# Patient Record
Sex: MALE | Race: BLACK OR AFRICAN AMERICAN | NOT HISPANIC OR LATINO | Employment: PART TIME | URBAN - METROPOLITAN AREA
[De-identification: names, ages, dates, MRNs, and addresses within clinical notes are randomized per-mention and may not be internally consistent; named-entity substitution may affect disease eponyms.]

---

## 2022-01-09 ENCOUNTER — APPOINTMENT (OUTPATIENT)
Dept: CT IMAGING | Facility: CLINIC | Age: 27
End: 2022-01-09
Attending: EMERGENCY MEDICINE
Payer: MEDICAID

## 2022-01-09 ENCOUNTER — HOSPITAL ENCOUNTER (EMERGENCY)
Facility: CLINIC | Age: 27
Discharge: HOME | End: 2022-01-09
Attending: EMERGENCY MEDICINE | Admitting: EMERGENCY MEDICINE
Payer: MEDICAID

## 2022-01-09 VITALS
OXYGEN SATURATION: 99 % | TEMPERATURE: 98.3 F | DIASTOLIC BLOOD PRESSURE: 66 MMHG | WEIGHT: 195 LBS | RESPIRATION RATE: 13 BRPM | HEART RATE: 53 BPM | SYSTOLIC BLOOD PRESSURE: 114 MMHG

## 2022-01-09 DIAGNOSIS — R56.9 SEIZURES (H): ICD-10-CM

## 2022-01-09 LAB
ALBUMIN SERPL-MCNC: 3.8 G/DL (ref 3.5–5)
ALBUMIN UR-MCNC: NEGATIVE MG/DL
ALP SERPL-CCNC: 59 U/L (ref 45–120)
ALT SERPL W P-5'-P-CCNC: 19 U/L (ref 0–45)
AMPHETAMINES UR QL SCN: ABNORMAL
ANION GAP SERPL CALCULATED.3IONS-SCNC: 5 MMOL/L (ref 5–18)
APPEARANCE UR: CLEAR
AST SERPL W P-5'-P-CCNC: 29 U/L (ref 0–40)
ATRIAL RATE - MUSE: 65 BPM
BARBITURATES UR QL: ABNORMAL
BASOPHILS # BLD AUTO: 0.1 10E3/UL (ref 0–0.2)
BASOPHILS NFR BLD AUTO: 1 %
BENZODIAZ UR QL: ABNORMAL
BILIRUB SERPL-MCNC: 0.5 MG/DL (ref 0–1)
BILIRUB UR QL STRIP: NEGATIVE
BUN SERPL-MCNC: 11 MG/DL (ref 8–22)
CALCIUM SERPL-MCNC: 8.9 MG/DL (ref 8.5–10.5)
CANNABINOIDS UR QL SCN: ABNORMAL
CHLORIDE BLD-SCNC: 109 MMOL/L (ref 98–107)
CO2 SERPL-SCNC: 25 MMOL/L (ref 22–31)
COCAINE UR QL: ABNORMAL
COLOR UR AUTO: ABNORMAL
CREAT SERPL-MCNC: 0.95 MG/DL (ref 0.7–1.3)
CREAT UR-MCNC: 137 MG/DL
DIASTOLIC BLOOD PRESSURE - MUSE: 72 MMHG
EOSINOPHIL # BLD AUTO: 0.3 10E3/UL (ref 0–0.7)
EOSINOPHIL NFR BLD AUTO: 3 %
ERYTHROCYTE [DISTWIDTH] IN BLOOD BY AUTOMATED COUNT: 11.7 % (ref 10–15)
ETHANOL SERPL-MCNC: <10 MG/DL
GFR SERPL CREATININE-BSD FRML MDRD: >90 ML/MIN/1.73M2
GLUCOSE BLD-MCNC: 99 MG/DL (ref 70–125)
GLUCOSE UR STRIP-MCNC: NEGATIVE MG/DL
HCT VFR BLD AUTO: 43.6 % (ref 40–53)
HGB BLD-MCNC: 14.8 G/DL (ref 13.3–17.7)
HGB UR QL STRIP: NEGATIVE
IMM GRANULOCYTES # BLD: 0.2 10E3/UL
IMM GRANULOCYTES NFR BLD: 2 %
INTERPRETATION ECG - MUSE: NORMAL
KETONES UR STRIP-MCNC: NEGATIVE MG/DL
LACTATE SERPL-SCNC: 1.7 MMOL/L (ref 0.7–2)
LEUKOCYTE ESTERASE UR QL STRIP: NEGATIVE
LYMPHOCYTES # BLD AUTO: 2 10E3/UL (ref 0.8–5.3)
LYMPHOCYTES NFR BLD AUTO: 21 %
MCH RBC QN AUTO: 31 PG (ref 26.5–33)
MCHC RBC AUTO-ENTMCNC: 33.9 G/DL (ref 31.5–36.5)
MCV RBC AUTO: 91 FL (ref 78–100)
METHADONE UR QL SCN: ABNORMAL
MONOCYTES # BLD AUTO: 0.8 10E3/UL (ref 0–1.3)
MONOCYTES NFR BLD AUTO: 8 %
MUCOUS THREADS #/AREA URNS LPF: PRESENT /LPF
NEUTROPHILS # BLD AUTO: 6.4 10E3/UL (ref 1.6–8.3)
NEUTROPHILS NFR BLD AUTO: 65 %
NITRATE UR QL: NEGATIVE
NRBC # BLD AUTO: 0 10E3/UL
NRBC BLD AUTO-RTO: 0 /100
OPIATES UR QL SCN: ABNORMAL
OXYCODONE UR QL: ABNORMAL
P AXIS - MUSE: 63 DEGREES
PCP UR QL SCN: ABNORMAL
PH UR STRIP: 5 [PH] (ref 5–7)
PLATELET # BLD AUTO: 287 10E3/UL (ref 150–450)
POTASSIUM BLD-SCNC: 4.4 MMOL/L (ref 3.5–5)
PR INTERVAL - MUSE: 196 MS
PROT SERPL-MCNC: 6.5 G/DL (ref 6–8)
QRS DURATION - MUSE: 88 MS
QT - MUSE: 380 MS
QTC - MUSE: 395 MS
R AXIS - MUSE: 86 DEGREES
RBC # BLD AUTO: 4.78 10E6/UL (ref 4.4–5.9)
RBC URINE: <1 /HPF
SODIUM SERPL-SCNC: 139 MMOL/L (ref 136–145)
SP GR UR STRIP: 1.02 (ref 1–1.03)
SYSTOLIC BLOOD PRESSURE - MUSE: 131 MMHG
T AXIS - MUSE: 43 DEGREES
UROBILINOGEN UR STRIP-MCNC: <2 MG/DL
VENTRICULAR RATE- MUSE: 65 BPM
WBC # BLD AUTO: 9.6 10E3/UL (ref 4–11)
WBC URINE: 4 /HPF

## 2022-01-09 PROCEDURE — 99285 EMERGENCY DEPT VISIT HI MDM: CPT | Mod: 25

## 2022-01-09 PROCEDURE — 250N000013 HC RX MED GY IP 250 OP 250 PS 637: Performed by: EMERGENCY MEDICINE

## 2022-01-09 PROCEDURE — 82077 ASSAY SPEC XCP UR&BREATH IA: CPT | Performed by: EMERGENCY MEDICINE

## 2022-01-09 PROCEDURE — 258N000003 HC RX IP 258 OP 636: Performed by: EMERGENCY MEDICINE

## 2022-01-09 PROCEDURE — 83605 ASSAY OF LACTIC ACID: CPT | Performed by: EMERGENCY MEDICINE

## 2022-01-09 PROCEDURE — 80053 COMPREHEN METABOLIC PANEL: CPT | Performed by: EMERGENCY MEDICINE

## 2022-01-09 PROCEDURE — 70450 CT HEAD/BRAIN W/O DYE: CPT

## 2022-01-09 PROCEDURE — 250N000011 HC RX IP 250 OP 636: Performed by: EMERGENCY MEDICINE

## 2022-01-09 PROCEDURE — 80307 DRUG TEST PRSMV CHEM ANLYZR: CPT | Performed by: EMERGENCY MEDICINE

## 2022-01-09 PROCEDURE — 82040 ASSAY OF SERUM ALBUMIN: CPT | Performed by: EMERGENCY MEDICINE

## 2022-01-09 PROCEDURE — 81001 URINALYSIS AUTO W/SCOPE: CPT | Performed by: EMERGENCY MEDICINE

## 2022-01-09 PROCEDURE — 36415 COLL VENOUS BLD VENIPUNCTURE: CPT | Performed by: EMERGENCY MEDICINE

## 2022-01-09 PROCEDURE — 96365 THER/PROPH/DIAG IV INF INIT: CPT

## 2022-01-09 PROCEDURE — 85025 COMPLETE CBC W/AUTO DIFF WBC: CPT | Performed by: EMERGENCY MEDICINE

## 2022-01-09 PROCEDURE — C9803 HOPD COVID-19 SPEC COLLECT: HCPCS

## 2022-01-09 PROCEDURE — 93005 ELECTROCARDIOGRAM TRACING: CPT | Performed by: EMERGENCY MEDICINE

## 2022-01-09 RX ORDER — LEVETIRACETAM 500 MG/1
500 TABLET ORAL 2 TIMES DAILY
Qty: 40 TABLET | Refills: 0 | Status: SHIPPED | OUTPATIENT
Start: 2022-01-09 | End: 2022-01-29

## 2022-01-09 RX ORDER — ACETAMINOPHEN 325 MG/1
650 TABLET ORAL ONCE
Status: COMPLETED | OUTPATIENT
Start: 2022-01-09 | End: 2022-01-09

## 2022-01-09 RX ADMIN — ACETAMINOPHEN 650 MG: 325 TABLET ORAL at 10:50

## 2022-01-09 RX ADMIN — LEVETIRACETAM 1000 MG: 100 INJECTION, SOLUTION INTRAVENOUS at 10:29

## 2022-01-09 NOTE — ED TRIAGE NOTES
Pt brought in by [facility] for seizure in bed.  Spouse awoke to pt with clonic tonic seizure witnessed for 5 to 10 minutes with post ictal response observed by [facility].  Denies prior seizure hx.  BGL reported by EMS was 114. Pt is 10 days since covid test, (14 days ago he and his spouse tested positive).

## 2022-01-09 NOTE — ED PROVIDER NOTES
EMERGENCY DEPARTMENT ENCOUNTER      NAME: Ric Castro  AGE: 26 year old male  YOB: 1995  MRN: [id number]  EVALUATION DATE & TIME: 1/9/2022  6:55 AM    PCP: No primary care provider on file.    ED PROVIDER: Nain David M.D.      Chief Complaint   Patient presents with     Seizures         FINAL IMPRESSION:  1. Seizures (H)          ED COURSE & MEDICAL DECISION MAKING:    Pertinent Labs & Imaging studies reviewed. (See chart for details)  26 year old male presents to the Emergency Department for evaluation of seizure. Patient appears non toxic with stable vitals signs, patient is afebrile with no tachycardia or hypoxia, no increased work of breathing. Overall exam is benign.  Patient has a GCS of 15 with no focal neuro deficits.  He otherwise states that he feels back to his baseline, did note small tongue abrasion consistent with tongue biting otherwise there is no outward signs of trauma he denies any acute pain.  Wife witnessed the seizure and there was no falls or trauma during the episode.  Patient denies any consistent alcohol use, no illicit drug use and he has no toxidrome appearance and certainly does not appear intoxicated.  Concern for first-time seizure, considered but less likely intracranial mass or bleed.  Moves his neck freely, no fever, certainly nothing to suggest meningitis.  Considered but low suspicion for electrolyte abnormality.  We will obtain screening labs, urine studies, tox screen and a head CT.    Reassessment: Labs showed no acute concerning findings.  CT imaging reported no acute concerning findings.  Urinalysis showed no blood or signs of infection, urine tox screen was positive for cannabinoids but otherwise negative.  Patient had no repeat seizure activity here.  Did discuss patient case with Dr. Fraser, on-call neurology, at this time he is recommending loading the patient with a dose of Keppra while here in the department and then he feels patient can  be discharged on outpatient course of p.o. Keppra with close outpatient neurological follow-up.  On my repeat exam the patient appears quite well and comfortable.  Discussed all the findings and neurological recommendations here from neurology and the patient agrees with discharge and the care plan.  Patient was given strict seizure restriction guidelines such as no driving until he can be seen by neurology, also recommending abstinence of marijuana, specifically synthetic marijuana as this may be related to seizure.  Discussed all these findings and recommendations with patient who felt reassured and comfortable with discharge.  He was given Keppra here and will be discharged on a course of oral Keppra.  All questions were answered and reasons to return discussed.  Patient felt comfortable this plan was discharged in stable condition.    At the conclusion of the encounter I discussed the results of all of the tests and the disposition. The questions were answered and return precautions provided. The patient or family acknowledged understanding and was agreeable with the care plan.     7:17 AM I introduced myself to the patient, performed my physical exam, and discussed plan for ED workup.  9:46 AM spoke with Dr. Yanez, neurology. Updated patient on findings and care plan.    MEDICATIONS GIVEN IN THE EMERGENCY:  Medications   levETIRAcetam (KEPPRA) 1,000 mg in sodium chloride 0.9 % 100 mL intermittent infusion (0 mg Intravenous Stopped 1/9/22 1050)   acetaminophen (TYLENOL) tablet 650 mg (650 mg Oral Given 1/9/22 1050)       NEW PRESCRIPTIONS STARTED AT TODAY'S ER VISIT  Discharge Medication List as of 1/9/2022 10:54 AM      START taking these medications    Details   levETIRAcetam (KEPPRA) 500 MG tablet Take 1 tablet (500 mg) by mouth 2 times daily for 20 days, Disp-40 tablet, R-0, Local Print                  =================================================================    HPI    Patient information was  obtained from: Patient and Patient's Wife    Use of Intrepreter: N/A         Ric Phil Castro is a 26 year old male who presents to the ED by EMS for evaluation of seizure activity.    Per the patient's wife, the patient awoke this morning around 0600 to use the restroom and then came back to bed to lay down. She then noticed the patient was in distress, his eyes rolled back into his head, and he began shaking. The patient was unresponsive and the episode lasted for about 5 minutes before resolving on its own.     The patient currently feels back to normal and felt normal for the past few days. He does endorse some mild shortness of breath over the last several days, and he was recently diagnosed with COVID. He otherwise denies any recent trauma or injury, headache, fever, chest pain, change in bowel or bladder habits, rash, focal weakness, or any other complaints at this time.    Social History: Saul tobacco use. Endorses occasional alcohol use. Denies illicit drug use.      REVIEW OF SYSTEMS   Constitutional:  Denies fever, chills  Respiratory:  Denies productive cough. Endorses mild shortness of breath.  Cardiovascular:  Denies chest pain, palpitations  GI:  Denies abdominal pain, nausea, vomiting, or change in bowel or bladder habits   Musculoskeletal:  Denies any new muscle/joint swelling  Skin:  Denies rash   Neurologic:  Denies focal weakness. Endorses seizure.  All systems negative except as marked.     PAST MEDICAL HISTORY:  History reviewed. No pertinent past medical history.    PAST SURGICAL HISTORY:  History reviewed. No pertinent surgical history.      CURRENT MEDICATIONS:    Prior to Admission medications    Not on File        ALLERGIES:  Allergies   Allergen Reactions     Iodine Rash       FAMILY HISTORY:  History reviewed. No pertinent family history.    SOCIAL HISTORY:   Social History     Socioeconomic History     Marital status: Single     Spouse name: Not on file     Number of children:  Not on file     Years of education: Not on file     Highest education level: Not on file   Occupational History     Not on file   Tobacco Use     Smoking status: Not on file     Smokeless tobacco: Not on file   Substance and Sexual Activity     Alcohol use: Not on file     Drug use: Not on file     Sexual activity: Not on file   Other Topics Concern     Not on file   Social History Narrative     Not on file     Social Determinants of Health     Financial Resource Strain: Not on file   Food Insecurity: Not on file   Transportation Needs: Not on file   Physical Activity: Not on file   Stress: Not on file   Social Connections: Not on file   Intimate Partner Violence: Not on file   Housing Stability: Not on file       VITALS:  Patient Vitals for the past 24 hrs:   BP Temp Temp src Pulse Resp SpO2 Weight   01/09/22 0930 -- -- -- 53 13 99 % --   01/09/22 0815 114/66 -- -- 61 17 100 % --   01/09/22 0702 131/72 98.3  F (36.8  C) Oral 85 20 98 % 88.5 kg (195 lb)        PHYSICAL EXAM    Constitutional:  Awake, alert, in no apparent distress  HENT:  Normocephalic, Atraumatic. Bilateral external ears normal. Oropharynx moist. Lateral abrasion on the side of the tongue consistent with tongue-biting injury. Nose normal. Neck- Normal range of motion with no guarding, no signs of meningeal irritation, no midline cervical tenderness, Supple, No stridor.   Eyes:  PERRL, EOMI with no signs of entrapment, Conjunctiva normal, No discharge.   Respiratory:  Normal breath sounds, No respiratory distress, No wheezing.    Cardiovascular:  Normal heart rate, Normal rhythm, No appreciable rubs or gallops.   GI:  Soft, No tenderness, No distension, No palpable masses  Musculoskeletal:  Intact distal pulses, No edema. Good range of motion in all major joints. No tenderness to palpation or major deformities noted.  Integument:  Warm, Dry, No erythema, No rash.   Neurologic:  Alert & oriented, Normal motor function, Normal sensory function, No  focal deficits noted.   Psychiatric:  Affect normal, Judgment normal, Mood normal.     LAB:  All pertinent labs reviewed and interpreted.  Results for orders placed or performed during the hospital encounter of 01/09/22   CT Head w/o Contrast    Impression    IMPRESSION:  1.  Normal head CT.   Comprehensive metabolic panel   Result Value Ref Range    Sodium 139 136 - 145 mmol/L    Potassium 4.4 3.5 - 5.0 mmol/L    Chloride 109 (H) 98 - 107 mmol/L    Carbon Dioxide (CO2) 25 22 - 31 mmol/L    Anion Gap 5 5 - 18 mmol/L    Urea Nitrogen 11 8 - 22 mg/dL    Creatinine 0.95 0.70 - 1.30 mg/dL    Calcium 8.9 8.5 - 10.5 mg/dL    Glucose 99 70 - 125 mg/dL    Alkaline Phosphatase 59 45 - 120 U/L    AST 29 0 - 40 U/L    ALT 19 0 - 45 U/L    Protein Total 6.5 6.0 - 8.0 g/dL    Albumin 3.8 3.5 - 5.0 g/dL    Bilirubin Total 0.5 0.0 - 1.0 mg/dL    GFR Estimate >90 >60 mL/min/1.73m2   Lactic acid whole blood   Result Value Ref Range    Lactic Acid 1.7 0.7 - 2.0 mmol/L   Ethyl Alcohol Level   Result Value Ref Range    Alcohol, Blood <10 None detected mg/dL   UA with Microscopic reflex to Culture    Specimen: Urine, Clean Catch   Result Value Ref Range    Color Urine Light Yellow Colorless, Straw, Light Yellow, Yellow    Appearance Urine Clear Clear    Glucose Urine Negative Negative mg/dL    Bilirubin Urine Negative Negative    Ketones Urine Negative Negative mg/dL    Specific Gravity Urine 1.021 1.001 - 1.030    Blood Urine Negative Negative    pH Urine 5.0 5.0 - 7.0    Protein Albumin Urine Negative Negative mg/dL    Urobilinogen Urine <2.0 <2.0 mg/dL    Nitrite Urine Negative Negative    Leukocyte Esterase Urine Negative Negative    Mucus Urine Present (A) None Seen /LPF    RBC Urine <1 <=2 /HPF    WBC Urine 4 <=5 /HPF   CBC with platelets and differential   Result Value Ref Range    WBC Count 9.6 4.0 - 11.0 10e3/uL    RBC Count 4.78 4.40 - 5.90 10e6/uL    Hemoglobin 14.8 13.3 - 17.7 g/dL    Hematocrit 43.6 40.0 - 53.0 %    MCV 91  78 - 100 fL    MCH 31.0 26.5 - 33.0 pg    MCHC 33.9 31.5 - 36.5 g/dL    RDW 11.7 10.0 - 15.0 %    Platelet Count 287 150 - 450 10e3/uL    % Neutrophils 65 %    % Lymphocytes 21 %    % Monocytes 8 %    % Eosinophils 3 %    % Basophils 1 %    % Immature Granulocytes 2 %    NRBCs per 100 WBC 0 <1 /100    Absolute Neutrophils 6.4 1.6 - 8.3 10e3/uL    Absolute Lymphocytes 2.0 0.8 - 5.3 10e3/uL    Absolute Monocytes 0.8 0.0 - 1.3 10e3/uL    Absolute Eosinophils 0.3 0.0 - 0.7 10e3/uL    Absolute Basophils 0.1 0.0 - 0.2 10e3/uL    Absolute Immature Granulocytes 0.2 <=0.4 10e3/uL    Absolute NRBCs 0.0 10e3/uL   Drugs of Abuse 1+ Panel, Urine ([facility])   Result Value Ref Range    Amphetamines Urine Screen Negative Screen Negative    Benzodiazepines Urine Screen Negative Screen Negative    Opiates Urine Screen Negative Screen Negative    PCP Urine Screen Negative Screen Negative    Cannabinoids Urine Screen Positive (A) Screen Negative    Barbiturates Urine Screen Negative Screen Negative    Cocaine Urine Screen Negative Screen Negative    Methadone Urine Screen Negative Screen Negative    Oxycodone Urine Screen Negative Screen Negative    Creatinine Urine mg/dL 137 mg/dL   ECG 12-LEAD WITH MUSE ([facility])   Result Value Ref Range    Systolic Blood Pressure 131 mmHg    Diastolic Blood Pressure 72 mmHg    Ventricular Rate 65 BPM    Atrial Rate 65 BPM    CO Interval 196 ms    QRS Duration 88 ms     ms    QTc 395 ms    P Axis 63 degrees    R AXIS 86 degrees    T Axis 43 degrees    Interpretation ECG       Sinus rhythm  Normal ECG  No previous ECGs available  Confirmed by SEE ED PROVIDER NOTE FOR, ECG INTERPRETATION ([id number]),  LIZBETH LOPEZ ([id number]) on 1/9/2022 8:02:32 AM         RADIOLOGY:  CT Head w/o Contrast   Final Result   IMPRESSION:   1.  Normal head CT.             EKG:    Sinus rhythm, no acute ischemic changes, no concerning dysrhythmias or interval prolongation  I have independently reviewed and interpreted  the EKG(s) documented above.    PROCEDURES:         I, Benjamin Mcfarland, am serving as a scribe to document services personally performed by Nain David MD, based on my observation and the provider's statements to me. I, Nain David MD attest that Benjamin Mcfarland is acting in a scribe capacity, has observed my performance of the services and has documented them in accordance with my direction.    Nain David M.D.  Emergency Medicine  [facility] EMERGENCY ROOM  [address] [facility] [address]  [phone]  Dept: [phone]     Nain David MD  01/09/22 1232

## 2022-01-09 NOTE — ED NOTES
Bed: [facility]-01  Expected date: 1/9/22  Expected time: 6:47 AM  Means of arrival: Ambulance  Comments:  Yvonne